# Patient Record
Sex: FEMALE | Race: WHITE | URBAN - METROPOLITAN AREA
[De-identification: names, ages, dates, MRNs, and addresses within clinical notes are randomized per-mention and may not be internally consistent; named-entity substitution may affect disease eponyms.]

---

## 2020-08-28 ENCOUNTER — APPOINTMENT (OUTPATIENT)
Dept: URBAN - METROPOLITAN AREA CLINIC 220 | Age: 25
Setting detail: DERMATOLOGY
End: 2020-08-28

## 2020-08-28 DIAGNOSIS — Q828 OTHER SPECIFIED ANOMALIES OF SKIN: ICD-10-CM

## 2020-08-28 DIAGNOSIS — Q826 OTHER SPECIFIED ANOMALIES OF SKIN: ICD-10-CM

## 2020-08-28 DIAGNOSIS — Q819 OTHER SPECIFIED ANOMALIES OF SKIN: ICD-10-CM

## 2020-08-28 DIAGNOSIS — L71.0 PERIORAL DERMATITIS: ICD-10-CM

## 2020-08-28 PROBLEM — L85.8 OTHER SPECIFIED EPIDERMAL THICKENING: Status: ACTIVE | Noted: 2020-08-28

## 2020-08-28 PROCEDURE — OTHER TREATMENT REGIMEN: OTHER

## 2020-08-28 PROCEDURE — 99202 OFFICE O/P NEW SF 15 MIN: CPT

## 2020-08-28 PROCEDURE — OTHER PRESCRIPTION: OTHER

## 2020-08-28 PROCEDURE — OTHER COUNSELING: OTHER

## 2020-08-28 PROCEDURE — OTHER REASSURANCE: OTHER

## 2020-08-28 PROCEDURE — OTHER MIPS QUALITY: OTHER

## 2020-08-28 RX ORDER — DOXYCYCLINE HYCLATE 100 MG/1
TABLET, COATED ORAL
Qty: 30 | Refills: 0 | Status: ERX | COMMUNITY
Start: 2020-08-28

## 2020-08-28 RX ORDER — CLINDAMYCIN PHOSPHATE 10 MG/ML
LOTION TOPICAL
Qty: 1 | Refills: 3 | Status: ERX | COMMUNITY
Start: 2020-08-28

## 2020-08-28 ASSESSMENT — LOCATION SIMPLE DESCRIPTION DERM
LOCATION SIMPLE: LEFT THIGH
LOCATION SIMPLE: RIGHT THIGH
LOCATION SIMPLE: LEFT CHEEK
LOCATION SIMPLE: RIGHT POSTERIOR UPPER ARM
LOCATION SIMPLE: LEFT POSTERIOR UPPER ARM

## 2020-08-28 ASSESSMENT — LOCATION ZONE DERM
LOCATION ZONE: LEG
LOCATION ZONE: ARM
LOCATION ZONE: FACE

## 2020-08-28 ASSESSMENT — LOCATION DETAILED DESCRIPTION DERM
LOCATION DETAILED: LEFT CENTRAL MALAR CHEEK
LOCATION DETAILED: RIGHT ANTERIOR DISTAL THIGH
LOCATION DETAILED: RIGHT PROXIMAL POSTERIOR UPPER ARM
LOCATION DETAILED: LEFT ANTERIOR PROXIMAL THIGH
LOCATION DETAILED: LEFT DISTAL POSTERIOR UPPER ARM

## 2020-08-28 NOTE — PROCEDURE: TREATMENT REGIMEN
Samples Given: Amlactin and Cerave SA given Apply BID
Detail Level: Zone
Plan: 1. Discontinue Clobetasol Ointment- BID\\n2. Initiate Doxycycline 100mg 1 po QD with food and water \\n3. Initiate clindamycin lotion bid prn rash

## 2020-08-28 NOTE — PROCEDURE: MIPS QUALITY
Detail Level: Detailed
Quality 130: Documentation Of Current Medications In The Medical Record: Current Medications Documented
Quality 110: Preventive Care And Screening: Influenza Immunization: Influenza Immunization previously received during influenza season
Quality 226: Preventive Care And Screening: Tobacco Use: Screening And Cessation Intervention: Patient screened for tobacco use and is an ex/non-smoker
Quality 431: Preventive Care And Screening: Unhealthy Alcohol Use - Screening: Patient screened for unhealthy alcohol use using a single question and scores less than 2 times per year

## 2020-10-22 ENCOUNTER — APPOINTMENT (OUTPATIENT)
Dept: URBAN - METROPOLITAN AREA CLINIC 220 | Age: 25
Setting detail: DERMATOLOGY
End: 2020-10-22

## 2020-10-22 DIAGNOSIS — L71.0 PERIORAL DERMATITIS: ICD-10-CM

## 2020-10-22 DIAGNOSIS — L71.8 OTHER ROSACEA: ICD-10-CM

## 2020-10-22 DIAGNOSIS — L98.8 OTHER SPECIFIED DISORDERS OF THE SKIN AND SUBCUTANEOUS TISSUE: ICD-10-CM

## 2020-10-22 PROCEDURE — OTHER OTHER: OTHER

## 2020-10-22 PROCEDURE — OTHER REASSURANCE: OTHER

## 2020-10-22 PROCEDURE — OTHER MEDICAL CONSULTATION: DYNAMIC RHYTIDES: OTHER

## 2020-10-22 PROCEDURE — OTHER MIPS QUALITY: OTHER

## 2020-10-22 PROCEDURE — 99213 OFFICE O/P EST LOW 20 MIN: CPT

## 2020-10-22 PROCEDURE — OTHER TREATMENT REGIMEN: OTHER

## 2020-10-22 PROCEDURE — OTHER PRESCRIPTION: OTHER

## 2020-10-22 PROCEDURE — OTHER COUNSELING: OTHER

## 2020-10-22 RX ORDER — IVERMECTIN 10 MG/G
CREAM TOPICAL
Qty: 1 | Refills: 11 | Status: ERX | COMMUNITY
Start: 2020-10-22

## 2020-10-22 ASSESSMENT — LOCATION DETAILED DESCRIPTION DERM
LOCATION DETAILED: LEFT INFERIOR MEDIAL FOREHEAD
LOCATION DETAILED: PHILTRUM

## 2020-10-22 ASSESSMENT — LOCATION SIMPLE DESCRIPTION DERM
LOCATION SIMPLE: LEFT FOREHEAD
LOCATION SIMPLE: UPPER LIP

## 2020-10-22 ASSESSMENT — LOCATION ZONE DERM
LOCATION ZONE: LIP
LOCATION ZONE: FACE

## 2020-10-22 NOTE — PROCEDURE: TREATMENT REGIMEN
Detail Level: Zone
Plan: 1. Start Soolantra 1 % topical cream -Apply pea size amount to full face daily\\n2. Continue Neutrogena hydro boost moisturizer \\n3. Continue Clindamycin for “spot treating”

## 2020-10-22 NOTE — PROCEDURE: OTHER
Note Text (......Xxx Chief Complaint.): This diagnosis correlates with the
Other (Free Text): Cosmetic planner given, patient to schedule treatment with Dr. Wetzel
Detail Level: Zone
Other (Free Text): Cosmetic planner given, patient to schedule consultation with Dr. Wetzel.  Patient did not want to do botox today.

## 2021-04-29 ENCOUNTER — APPOINTMENT (OUTPATIENT)
Dept: URBAN - METROPOLITAN AREA CLINIC 220 | Age: 26
Setting detail: DERMATOLOGY
End: 2021-04-29

## 2021-04-29 DIAGNOSIS — D18.0 HEMANGIOMA: ICD-10-CM

## 2021-04-29 DIAGNOSIS — Z71.89 OTHER SPECIFIED COUNSELING: ICD-10-CM

## 2021-04-29 DIAGNOSIS — L71.8 OTHER ROSACEA: ICD-10-CM

## 2021-04-29 DIAGNOSIS — L81.4 OTHER MELANIN HYPERPIGMENTATION: ICD-10-CM

## 2021-04-29 DIAGNOSIS — L71.0 PERIORAL DERMATITIS: ICD-10-CM

## 2021-04-29 DIAGNOSIS — D485 NEOPLASM OF UNCERTAIN BEHAVIOR OF SKIN: ICD-10-CM

## 2021-04-29 DIAGNOSIS — D22 MELANOCYTIC NEVI: ICD-10-CM

## 2021-04-29 PROBLEM — D22.5 MELANOCYTIC NEVI OF TRUNK: Status: ACTIVE | Noted: 2021-04-29

## 2021-04-29 PROBLEM — D18.01 HEMANGIOMA OF SKIN AND SUBCUTANEOUS TISSUE: Status: ACTIVE | Noted: 2021-04-29

## 2021-04-29 PROBLEM — D48.5 NEOPLASM OF UNCERTAIN BEHAVIOR OF SKIN: Status: ACTIVE | Noted: 2021-04-29

## 2021-04-29 PROCEDURE — OTHER PRESCRIPTION MEDICATION MANAGEMENT: OTHER

## 2021-04-29 PROCEDURE — 11102 TANGNTL BX SKIN SINGLE LES: CPT

## 2021-04-29 PROCEDURE — OTHER SUNSCREEN RECOMMENDATIONS: OTHER

## 2021-04-29 PROCEDURE — OTHER REASSURANCE: OTHER

## 2021-04-29 PROCEDURE — 99214 OFFICE O/P EST MOD 30 MIN: CPT | Mod: 25

## 2021-04-29 PROCEDURE — OTHER MIPS QUALITY: OTHER

## 2021-04-29 PROCEDURE — OTHER COUNSELING: OTHER

## 2021-04-29 PROCEDURE — OTHER BIOPSY BY SHAVE METHOD: OTHER

## 2021-04-29 ASSESSMENT — LOCATION DETAILED DESCRIPTION DERM
LOCATION DETAILED: PHILTRUM
LOCATION DETAILED: LEFT POSTERIOR NECK
LOCATION DETAILED: RIGHT SUPERIOR UPPER BACK
LOCATION DETAILED: RIGHT INFERIOR MEDIAL UPPER BACK
LOCATION DETAILED: LEFT INFERIOR MEDIAL FOREHEAD
LOCATION DETAILED: SUPERIOR THORACIC SPINE

## 2021-04-29 ASSESSMENT — LOCATION ZONE DERM
LOCATION ZONE: TRUNK
LOCATION ZONE: NECK
LOCATION ZONE: FACE
LOCATION ZONE: LIP

## 2021-04-29 ASSESSMENT — LOCATION SIMPLE DESCRIPTION DERM
LOCATION SIMPLE: LEFT FOREHEAD
LOCATION SIMPLE: RIGHT UPPER BACK
LOCATION SIMPLE: POSTERIOR NECK
LOCATION SIMPLE: UPPER BACK
LOCATION SIMPLE: UPPER LIP

## 2021-04-29 NOTE — PROCEDURE: PRESCRIPTION MEDICATION MANAGEMENT
Plan: 1. Continue gentle cleanser twice daily\\n2. Continue Clindamycin lotion 1-2 tines daily PRN flares.
Render In Strict Bullet Format?: No
Plan: 1. Continue Soolantra 1 % topical cream -Apply pea size amount to full face daily\\n2. Continue Neutrogena hydro boost moisturizer
Detail Level: Zone

## 2021-04-29 NOTE — PROCEDURE: MIPS QUALITY
Detail Level: Detailed
Quality 130: Documentation Of Current Medications In The Medical Record: Current Medications Documented
Quality 110: Preventive Care And Screening: Influenza Immunization: Influenza Immunization previously received during influenza season
Quality 226: Preventive Care And Screening: Tobacco Use: Screening And Cessation Intervention: Patient screened for tobacco use and is an ex/non-smoker
Quality 431: Preventive Care And Screening: Unhealthy Alcohol Use - Screening: Patient screened for unhealthy alcohol use using a single question and scores less than 2 times per year
Quality 265: Biopsy Follow-Up: Biopsy results reviewed, communicated, tracked, and documented

## 2021-04-29 NOTE — PROCEDURE: BIOPSY BY SHAVE METHOD
Size Of Lesion In Cm: 0
Validate Note Data (See Information Below): No
Billing Type: Third-Party Bill
Post-Care Instructions: I reviewed with the patient in detail post-care instructions. Patient is to keep the biopsy site dry overnight, and then apply bacitracin twice daily until healed. Patient may apply hydrogen peroxide soaks to remove any crusting.
Anesthesia Volume In Cc (Will Not Render If 0): 1
Hemostasis: Drysol
Was A Bandage Applied: Yes
Electrodesiccation Text: The wound bed was treated with electrodesiccation after the biopsy was performed.
Curettage Text: The wound bed was treated with curettage after the biopsy was performed.
Consent: Written consent was obtained and risks were reviewed including but not limited to scarring, infection, bleeding, scabbing, incomplete removal, nerve damage and allergy to anesthesia.
Biopsy Method: Personna blade
Information: Selecting Yes will display possible errors in your note based on the variables you have selected. This validation is only offered as a suggestion for you. PLEASE NOTE THAT THE VALIDATION TEXT WILL BE REMOVED WHEN YOU FINALIZE YOUR NOTE. IF YOU WANT TO FAX A PRELIMINARY NOTE YOU WILL NEED TO TOGGLE THIS TO 'NO' IF YOU DO NOT WANT IT IN YOUR FAXED NOTE.
Cryotherapy Text: The wound bed was treated with cryotherapy after the biopsy was performed.
Anesthesia Type: 1% lidocaine with epinephrine
Biopsy Type: H and E
Silver Nitrate Text: The wound bed was treated with silver nitrate after the biopsy was performed.
Depth Of Biopsy: dermis
Dressing: bandage
Detail Level: Simple
Wound Care: Petrolatum
Electrodesiccation And Curettage Text: The wound bed was treated with electrodesiccation and curettage after the biopsy was performed.
Type Of Destruction Used: Curettage
Notification Instructions: Patient will be notified of biopsy results. However, patient instructed to call the office if not contacted within 2 weeks.

## 2021-12-15 ENCOUNTER — RX ONLY (RX ONLY)
Age: 26
End: 2021-12-15

## 2021-12-15 RX ORDER — IVERMECTIN 10 MG/G
CREAM TOPICAL
Qty: 45 | Refills: 4 | Status: ERX | COMMUNITY
Start: 2021-12-15

## 2022-09-08 ENCOUNTER — APPOINTMENT (OUTPATIENT)
Dept: URBAN - METROPOLITAN AREA CLINIC 211 | Age: 27
Setting detail: DERMATOLOGY
End: 2022-09-08

## 2022-09-08 DIAGNOSIS — D18.0 HEMANGIOMA: ICD-10-CM

## 2022-09-08 DIAGNOSIS — L82.1 OTHER SEBORRHEIC KERATOSIS: ICD-10-CM

## 2022-09-08 DIAGNOSIS — L71.8 OTHER ROSACEA: ICD-10-CM

## 2022-09-08 DIAGNOSIS — Z71.89 OTHER SPECIFIED COUNSELING: ICD-10-CM

## 2022-09-08 DIAGNOSIS — D22 MELANOCYTIC NEVI: ICD-10-CM

## 2022-09-08 DIAGNOSIS — L81.4 OTHER MELANIN HYPERPIGMENTATION: ICD-10-CM

## 2022-09-08 PROBLEM — D22.5 MELANOCYTIC NEVI OF TRUNK: Status: ACTIVE | Noted: 2022-09-08

## 2022-09-08 PROBLEM — D18.01 HEMANGIOMA OF SKIN AND SUBCUTANEOUS TISSUE: Status: ACTIVE | Noted: 2022-09-08

## 2022-09-08 PROCEDURE — OTHER SUNSCREEN RECOMMENDATIONS: OTHER

## 2022-09-08 PROCEDURE — OTHER PRESCRIPTION: OTHER

## 2022-09-08 PROCEDURE — OTHER REASSURANCE: OTHER

## 2022-09-08 PROCEDURE — OTHER COUNSELING: OTHER

## 2022-09-08 PROCEDURE — OTHER PRESCRIPTION MEDICATION MANAGEMENT: OTHER

## 2022-09-08 PROCEDURE — 99213 OFFICE O/P EST LOW 20 MIN: CPT

## 2022-09-08 PROCEDURE — OTHER MIPS QUALITY: OTHER

## 2022-09-08 RX ORDER — IVERMECTIN 10 MG/G
CREAM TOPICAL
Qty: 45 | Refills: 6 | Status: ERX | COMMUNITY
Start: 2022-09-08

## 2022-09-08 ASSESSMENT — LOCATION SIMPLE DESCRIPTION DERM
LOCATION SIMPLE: ABDOMEN
LOCATION SIMPLE: LEFT UPPER BACK
LOCATION SIMPLE: RIGHT UPPER BACK

## 2022-09-08 ASSESSMENT — LOCATION DETAILED DESCRIPTION DERM
LOCATION DETAILED: LEFT INFERIOR MEDIAL UPPER BACK
LOCATION DETAILED: RIGHT MID-UPPER BACK
LOCATION DETAILED: PERIUMBILICAL SKIN

## 2022-09-08 ASSESSMENT — LOCATION ZONE DERM: LOCATION ZONE: TRUNK

## 2022-09-08 NOTE — HPI: RASH (ROSACEA)
How Severe Is Your Rosacea?: mild
Is This A New Presentation, Or A Follow-Up?: Follow Up Rosacea
Additional History: Pain level 0/10

## 2022-09-08 NOTE — PROCEDURE: MIPS QUALITY
Quality 130: Documentation Of Current Medications In The Medical Record: Current Medications Documented
Quality 431: Preventive Care And Screening: Unhealthy Alcohol Use - Screening: Patient not identified as an unhealthy alcohol user when screened for unhealthy alcohol use using a systematic screening method
Quality 110: Preventive Care And Screening: Influenza Immunization: Influenza Immunization previously received during influenza season
Quality 226: Preventive Care And Screening: Tobacco Use: Screening And Cessation Intervention: Patient screened for tobacco use and is an ex/non-smoker
Quality 402: Tobacco Use And Help With Quitting Among Adolescents: Patient screened for tobacco and is an ex-smoker
Detail Level: Detailed

## 2022-09-08 NOTE — HPI: FULL BODY SKIN EXAMINATION
How Severe Are Your Spot(S)?: mild
What Type Of Note Output Would You Prefer (Optional)?: Bullet Format
What Is The Reason For Today's Visit?: Full Body Skin Examination
What Is The Reason For Today's Visit? (Being Monitored For X): the re-examination of lesions previously examined
Additional History: Pain level 0/10

## 2022-09-08 NOTE — PROCEDURE: PRESCRIPTION MEDICATION MANAGEMENT
Plan: 1.  continue Soolantra qd\\n2.  continue OTC adapalene 0.1% gel qhs prn
Detail Level: Zone
Render In Strict Bullet Format?: No

## 2023-01-09 ENCOUNTER — APPOINTMENT (OUTPATIENT)
Dept: URBAN - METROPOLITAN AREA CLINIC 211 | Age: 28
Setting detail: DERMATOLOGY
End: 2023-01-09
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
## 2023-01-09 DIAGNOSIS — L71.0 PERIORAL DERMATITIS: ICD-10-CM
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
## 2023-01-09 PROCEDURE — OTHER PRESCRIPTION MEDICATION MANAGEMENT: OTHER

## 2023-01-09 PROCEDURE — OTHER OTHER: OTHER

## 2023-01-09 PROCEDURE — OTHER COUNSELING: OTHER

## 2023-01-09 PROCEDURE — OTHER PHOTO-DOCUMENTATION: OTHER

## 2023-01-09 PROCEDURE — OTHER MIPS QUALITY: OTHER

## 2023-01-09 PROCEDURE — 99214 OFFICE O/P EST MOD 30 MIN: CPT

## 2023-01-09 PROCEDURE — OTHER PRESCRIPTION: OTHER

## 2023-01-09 RX ORDER — DOXYCYCLINE HYCLATE 50 MG/1
TABLET ORAL
Qty: 60 | Refills: 0 | Status: ERX | COMMUNITY
Start: 2023-01-09

## 2023-01-09 RX ORDER — CLINDAMYCIN PHOSPHATE 10 MG/ML
LOTION TOPICAL
Qty: 60 | Refills: 0 | Status: ERX

## 2023-01-09 ASSESSMENT — LOCATION SIMPLE DESCRIPTION DERM
LOCATION SIMPLE: RIGHT CHEEK
LOCATION SIMPLE: LEFT SHOULDER
LOCATION SIMPLE: RIGHT CHEEK
LOCATION SIMPLE: LEFT LIP

## 2023-01-09 ASSESSMENT — INVESTIGATOR STATIC GLOBAL ASSESSMENT: IN YOUR EXPERIENCE, AMONG ALL PATIENTS YOU HAVE SEEN WITH THIS CONDITION, HOW SEVERE IS THIS PATIENT'S CONDITION?: CLEAR

## 2023-01-09 ASSESSMENT — LOCATION ZONE DERM
LOCATION ZONE: LIP
LOCATION ZONE: ARM
LOCATION ZONE: FACE
LOCATION ZONE: FACE

## 2023-01-09 ASSESSMENT — LOCATION DETAILED DESCRIPTION DERM
LOCATION DETAILED: RIGHT INFERIOR MEDIAL MALAR CHEEK
LOCATION DETAILED: LEFT POSTERIOR SHOULDER
LOCATION DETAILED: RIGHT INFERIOR MEDIAL MALAR CHEEK
LOCATION DETAILED: LEFT UPPER CUTANEOUS LIP

## 2023-01-09 NOTE — PROCEDURE: OTHER
Render Risk Assessment In Note?: no
Detail Level: Zone
Note Text (......Xxx Chief Complaint.): This diagnosis correlates with the
Other (Free Text): Patient is actively tanning\\nDiscussed risks of melanoma\\nSuggested 6 month close screenings with [facility]

## 2023-01-09 NOTE — PROCEDURE: PRESCRIPTION MEDICATION MANAGEMENT
Detail Level: Zone
Plan: 1. Begin Targadox 60mg  - take two tablets daily with food/water\\n2. Restart clindamycin lotion - apply to AA twice daily x2 weeks then prn flares
Render In Strict Bullet Format?: Yes

## 2023-01-11 ENCOUNTER — RX ONLY (RX ONLY)
Age: 28
End: 2023-01-11

## 2023-01-11 RX ORDER — FLUCONAZOLE 150 MG/1
TABLET ORAL
Qty: 1 | Refills: 0 | Status: ERX | COMMUNITY
Start: 2023-01-11

## 2023-09-13 ENCOUNTER — APPOINTMENT (OUTPATIENT)
Dept: URBAN - METROPOLITAN AREA CLINIC 211 | Age: 28
Setting detail: DERMATOLOGY
End: 2023-09-13

## 2024-04-17 ENCOUNTER — APPOINTMENT (OUTPATIENT)
Dept: URBAN - METROPOLITAN AREA CLINIC 211 | Age: 29
Setting detail: DERMATOLOGY
End: 2024-04-17
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
## 2024-04-17 DIAGNOSIS — L71.8 OTHER ROSACEA: ICD-10-CM

## 2024-04-17 DIAGNOSIS — Z71.89 OTHER SPECIFIED COUNSELING: ICD-10-CM

## 2024-04-17 DIAGNOSIS — L82.1 OTHER SEBORRHEIC KERATOSIS: ICD-10-CM

## 2024-04-17 DIAGNOSIS — D18.0 HEMANGIOMA: ICD-10-CM

## 2024-04-17 DIAGNOSIS — L81.4 OTHER MELANIN HYPERPIGMENTATION: ICD-10-CM

## 2024-04-17 DIAGNOSIS — D22 MELANOCYTIC NEVI: ICD-10-CM

## 2024-04-17 DIAGNOSIS — Z12.83 ENCOUNTER FOR SCREENING FOR MALIGNANT NEOPLASM OF SKIN: ICD-10-CM
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
## 2024-04-17 PROBLEM — D22.5 MELANOCYTIC NEVI OF TRUNK: Status: ACTIVE | Noted: 2024-04-17

## 2024-04-17 PROBLEM — D18.01 HEMANGIOMA OF SKIN AND SUBCUTANEOUS TISSUE: Status: ACTIVE | Noted: 2024-04-17

## 2024-04-17 PROCEDURE — OTHER PRESCRIPTION: OTHER

## 2024-04-17 PROCEDURE — 99213 OFFICE O/P EST LOW 20 MIN: CPT

## 2024-04-17 PROCEDURE — OTHER COUNSELING: OTHER

## 2024-04-17 PROCEDURE — OTHER SUNSCREEN RECOMMENDATIONS: OTHER

## 2024-04-17 PROCEDURE — OTHER REASSURANCE: OTHER

## 2024-04-17 PROCEDURE — OTHER MIPS QUALITY: OTHER

## 2024-04-17 RX ORDER — CLINDAMYCIN PHOSPHATE 10 MG/ML
LOTION TOPICAL
Qty: 60 | Refills: 11 | Status: ERX

## 2024-04-17 RX ORDER — IVERMECTIN 10 MG/G
CREAM TOPICAL
Qty: 45 | Refills: 11 | Status: ERX

## 2024-04-17 ASSESSMENT — LOCATION SIMPLE DESCRIPTION DERM
LOCATION SIMPLE: ABDOMEN
LOCATION SIMPLE: POSTERIOR NECK
LOCATION SIMPLE: CHEST

## 2024-04-17 ASSESSMENT — LOCATION DETAILED DESCRIPTION DERM
LOCATION DETAILED: LEFT MEDIAL INFERIOR CHEST
LOCATION DETAILED: RIGHT INFERIOR POSTERIOR NECK
LOCATION DETAILED: EPIGASTRIC SKIN

## 2024-04-17 ASSESSMENT — LOCATION ZONE DERM
LOCATION ZONE: NECK
LOCATION ZONE: TRUNK